# Patient Record
Sex: FEMALE | Race: WHITE | NOT HISPANIC OR LATINO | Employment: OTHER | RURAL
[De-identification: names, ages, dates, MRNs, and addresses within clinical notes are randomized per-mention and may not be internally consistent; named-entity substitution may affect disease eponyms.]

---

## 2022-09-30 ENCOUNTER — HOSPITAL ENCOUNTER (OUTPATIENT)
Dept: RADIOLOGY | Facility: HOSPITAL | Age: 57
Discharge: HOME | End: 2022-09-30
Attending: NURSE PRACTITIONER
Payer: COMMERCIAL

## 2022-09-30 DIAGNOSIS — J40 BRONCHITIS, NOT SPECIFIED AS ACUTE OR CHRONIC: ICD-10-CM

## 2022-09-30 PROCEDURE — 71046 X-RAY EXAM CHEST 2 VIEWS: CPT | Mod: TC

## 2023-04-17 ENCOUNTER — HOSPITAL ENCOUNTER (EMERGENCY)
Facility: HOSPITAL | Age: 58
Discharge: HOME | End: 2023-04-18
Payer: COMMERCIAL

## 2023-04-17 DIAGNOSIS — R00.0 TACHYCARDIA: ICD-10-CM

## 2023-04-17 DIAGNOSIS — I48.91 ATRIAL FIBRILLATION WITH RAPID VENTRICULAR RESPONSE: ICD-10-CM

## 2023-04-17 DIAGNOSIS — I49.9 CARDIAC RHYTHM DISORDER OR DISTURBANCE OR CHANGE: ICD-10-CM

## 2023-04-17 DIAGNOSIS — I48.91 NEW ONSET A-FIB: Primary | ICD-10-CM

## 2023-04-17 PROCEDURE — 99285 EMERGENCY DEPT VISIT HI MDM: CPT | Mod: ,,, | Performed by: NURSE PRACTITIONER

## 2023-04-17 PROCEDURE — 93010 ELECTROCARDIOGRAM REPORT: CPT | Mod: ,,, | Performed by: INTERNAL MEDICINE

## 2023-04-17 PROCEDURE — 93010 EKG 12-LEAD: ICD-10-PCS | Mod: ,,, | Performed by: INTERNAL MEDICINE

## 2023-04-17 PROCEDURE — 93005 ELECTROCARDIOGRAM TRACING: CPT

## 2023-04-17 PROCEDURE — 99285 PR EMERGENCY DEPT VISIT,LEVEL V: ICD-10-PCS | Mod: ,,, | Performed by: NURSE PRACTITIONER

## 2023-04-17 PROCEDURE — 99284 EMERGENCY DEPT VISIT MOD MDM: CPT | Mod: 25

## 2023-04-17 RX ORDER — AZATHIOPRINE 50 MG/1
100 TABLET ORAL
COMMUNITY
Start: 2023-03-22

## 2023-04-17 RX ORDER — PANTOPRAZOLE SODIUM 40 MG/1
40 TABLET, DELAYED RELEASE ORAL EVERY MORNING
COMMUNITY
Start: 2023-03-18

## 2023-04-17 RX ORDER — MESALAMINE 1.2 G/1
TABLET, DELAYED RELEASE ORAL
COMMUNITY

## 2023-04-18 VITALS
OXYGEN SATURATION: 96 % | SYSTOLIC BLOOD PRESSURE: 144 MMHG | RESPIRATION RATE: 16 BRPM | WEIGHT: 176 LBS | BODY MASS INDEX: 31.18 KG/M2 | HEIGHT: 63 IN | HEART RATE: 88 BPM | TEMPERATURE: 98 F | DIASTOLIC BLOOD PRESSURE: 73 MMHG

## 2023-04-18 LAB
ALBUMIN SERPL BCP-MCNC: 3.7 G/DL (ref 3.5–5)
ALBUMIN/GLOB SERPL: 1 {RATIO}
ALP SERPL-CCNC: 147 U/L (ref 46–118)
ALT SERPL W P-5'-P-CCNC: 24 U/L (ref 13–56)
AMPHET UR QL SCN: NEGATIVE
ANION GAP SERPL CALCULATED.3IONS-SCNC: 12 MMOL/L (ref 7–16)
AST SERPL W P-5'-P-CCNC: 15 U/L (ref 15–37)
BACTERIA #/AREA URNS HPF: ABNORMAL /HPF
BARBITURATES UR QL SCN: NEGATIVE
BASOPHILS # BLD AUTO: 0.11 K/UL (ref 0–0.2)
BASOPHILS NFR BLD AUTO: 0.8 % (ref 0–1)
BENZODIAZ METAB UR QL SCN: NEGATIVE
BILIRUB SERPL-MCNC: 0.5 MG/DL (ref ?–1.2)
BILIRUB UR QL STRIP: NEGATIVE
BUN SERPL-MCNC: 24 MG/DL (ref 7–18)
BUN/CREAT SERPL: 25 (ref 6–20)
CALCIUM SERPL-MCNC: 8.8 MG/DL (ref 8.5–10.1)
CHLORIDE SERPL-SCNC: 102 MMOL/L (ref 98–107)
CLARITY UR: CLEAR
CO2 SERPL-SCNC: 30 MMOL/L (ref 21–32)
COCAINE UR QL SCN: NEGATIVE
COLOR UR: ABNORMAL
CREAT SERPL-MCNC: 0.95 MG/DL (ref 0.55–1.02)
DIFFERENTIAL METHOD BLD: ABNORMAL
EGFR (NO RACE VARIABLE) (RUSH/TITUS): 69 ML/MIN/1.73M²
EOSINOPHIL # BLD AUTO: 0.65 K/UL (ref 0–0.5)
EOSINOPHIL NFR BLD AUTO: 4.9 % (ref 1–4)
ERYTHROCYTE [DISTWIDTH] IN BLOOD BY AUTOMATED COUNT: 13.3 % (ref 11.5–14.5)
GLOBULIN SER-MCNC: 3.7 G/DL (ref 2–4)
GLUCOSE SERPL-MCNC: 165 MG/DL (ref 74–106)
GLUCOSE UR STRIP-MCNC: NEGATIVE MG/DL
HCT VFR BLD AUTO: 40.9 % (ref 38–47)
HGB BLD-MCNC: 13.3 G/DL (ref 12–16)
KETONES UR STRIP-SCNC: NEGATIVE MG/DL
LEUKOCYTE ESTERASE UR QL STRIP: NEGATIVE
LYMPHOCYTES # BLD AUTO: 3.39 K/UL (ref 1–4.8)
LYMPHOCYTES NFR BLD AUTO: 25.4 % (ref 27–41)
MAGNESIUM SERPL-MCNC: 2 MG/DL (ref 1.7–2.3)
MCH RBC QN AUTO: 32.1 PG (ref 27–31)
MCHC RBC AUTO-ENTMCNC: 32.5 G/DL (ref 32–36)
MCV RBC AUTO: 98.8 FL (ref 80–96)
MDA UR QL SCN: NEGATIVE
METHADONE UR QL SCN: NEGATIVE
METHAMPHET UR QL SCN: NEGATIVE
MONOCYTES # BLD AUTO: 1.02 K/UL (ref 0–0.8)
MONOCYTES NFR BLD AUTO: 7.6 % (ref 2–6)
MPC BLD CALC-MCNC: 11.7 FL (ref 9.4–12.4)
NEUTROPHILS # BLD AUTO: 8.17 K/UL (ref 1.8–7.7)
NEUTROPHILS NFR BLD AUTO: 61.3 % (ref 53–65)
NITRITE UR QL STRIP: NEGATIVE
OPIATES UR QL SCN: NEGATIVE
OXYCODONE UR QL SCN: NEGATIVE
PCP UR QL SCN: NEGATIVE
PH UR STRIP: 7 PH UNITS
PLATELET # BLD AUTO: 346 K/UL (ref 150–400)
POTASSIUM SERPL-SCNC: 3.8 MMOL/L (ref 3.5–5.1)
PROT SERPL-MCNC: 7.4 G/DL (ref 6.4–8.2)
PROT UR QL STRIP: NEGATIVE
RBC # BLD AUTO: 4.14 M/UL (ref 4.2–5.4)
RBC # UR STRIP: ABNORMAL /UL
RBC #/AREA URNS HPF: ABNORMAL /HPF
SODIUM SERPL-SCNC: 140 MMOL/L (ref 136–145)
SP GR UR STRIP: 1.01
SQUAMOUS #/AREA URNS LPF: ABNORMAL /LPF
THC UR QL SCN: NEGATIVE
TRICYCLICS UR QL SCN: NEGATIVE
TSH SERPL DL<=0.005 MIU/L-ACNC: 2.74 UIU/ML (ref 0.36–3.74)
UROBILINOGEN UR STRIP-ACNC: 0.2 MG/DL
WBC # BLD AUTO: 13.34 K/UL (ref 4.5–11)
WBC #/AREA URNS HPF: ABNORMAL /HPF

## 2023-04-18 PROCEDURE — 96375 TX/PRO/DX INJ NEW DRUG ADDON: CPT

## 2023-04-18 PROCEDURE — 80305 DRUG TEST PRSMV DIR OPT OBS: CPT | Performed by: NURSE PRACTITIONER

## 2023-04-18 PROCEDURE — 84443 ASSAY THYROID STIM HORMONE: CPT | Performed by: NURSE PRACTITIONER

## 2023-04-18 PROCEDURE — 93005 ELECTROCARDIOGRAM TRACING: CPT

## 2023-04-18 PROCEDURE — 93010 ELECTROCARDIOGRAM REPORT: CPT | Mod: 76,,, | Performed by: INTERNAL MEDICINE

## 2023-04-18 PROCEDURE — 96374 THER/PROPH/DIAG INJ IV PUSH: CPT

## 2023-04-18 PROCEDURE — 80053 COMPREHEN METABOLIC PANEL: CPT | Performed by: NURSE PRACTITIONER

## 2023-04-18 PROCEDURE — 83735 ASSAY OF MAGNESIUM: CPT | Performed by: NURSE PRACTITIONER

## 2023-04-18 PROCEDURE — 85025 COMPLETE CBC W/AUTO DIFF WBC: CPT | Performed by: NURSE PRACTITIONER

## 2023-04-18 PROCEDURE — 93010 EKG 12-LEAD: ICD-10-PCS | Mod: 76,,, | Performed by: INTERNAL MEDICINE

## 2023-04-18 PROCEDURE — 81001 URINALYSIS AUTO W/SCOPE: CPT | Performed by: NURSE PRACTITIONER

## 2023-04-18 PROCEDURE — 96361 HYDRATE IV INFUSION ADD-ON: CPT

## 2023-04-18 PROCEDURE — 25000003 PHARM REV CODE 250: Performed by: NURSE PRACTITIONER

## 2023-04-18 RX ORDER — DILTIAZEM HYDROCHLORIDE 5 MG/ML
5 INJECTION INTRAVENOUS
Status: DISCONTINUED | OUTPATIENT
Start: 2023-04-18 | End: 2023-04-18

## 2023-04-18 RX ORDER — DILTIAZEM HYDROCHLORIDE 5 MG/ML
0.35 INJECTION INTRAVENOUS
Status: COMPLETED | OUTPATIENT
Start: 2023-04-18 | End: 2023-04-18

## 2023-04-18 RX ORDER — DILTIAZEM HYDROCHLORIDE 5 MG/ML
20 INJECTION INTRAVENOUS
Status: COMPLETED | OUTPATIENT
Start: 2023-04-18 | End: 2023-04-18

## 2023-04-18 RX ORDER — METOPROLOL SUCCINATE 25 MG/1
25 TABLET, EXTENDED RELEASE ORAL DAILY
Qty: 30 TABLET | Refills: 0 | Status: SHIPPED | OUTPATIENT
Start: 2023-04-18 | End: 2024-04-17

## 2023-04-18 RX ORDER — POTASSIUM CHLORIDE 20 MEQ/1
20 TABLET, EXTENDED RELEASE ORAL
Status: COMPLETED | OUTPATIENT
Start: 2023-04-18 | End: 2023-04-18

## 2023-04-18 RX ADMIN — DILTIAZEM HYDROCHLORIDE 28 MG: 5 INJECTION INTRAVENOUS at 01:04

## 2023-04-18 RX ADMIN — POTASSIUM CHLORIDE 20 MEQ: 1500 TABLET, EXTENDED RELEASE ORAL at 03:04

## 2023-04-18 RX ADMIN — SODIUM CHLORIDE 1000 ML: 9 INJECTION, SOLUTION INTRAVENOUS at 12:04

## 2023-04-18 RX ADMIN — DILTIAZEM HYDROCHLORIDE 20 MG: 5 INJECTION, SOLUTION INTRAVENOUS at 12:04

## 2023-04-18 NOTE — ED PROVIDER NOTES
Encounter Date: 2023       History     Chief Complaint   Patient presents with    Hypertension    Tachycardia     58 yr old WF to ED with c/o palpitations tonight.  Denies chest pain.  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  Denies hx of symptoms     The history is provided by the patient.   Palpitations   This is a new problem. The current episode started 2 to 3 hours ago. The problem has been unchanged. The problem is associated with an unknown factor. Pertinent negatives include no diaphoresis, no chest pain, no chest pressure, no near-syncope, no syncope, no nausea, no vomiting, no dizziness, no weakness and no shortness of breath. She has tried nothing for the symptoms. There are no known risk factors.   Review of patient's allergies indicates:   Allergen Reactions    Latex, natural rubber Itching     Past Medical History:   Diagnosis Date    Ulcerative colitis      Past Surgical History:   Procedure Laterality Date    BACK SURGERY       SECTION       History reviewed. No pertinent family history.  Social History     Tobacco Use    Smoking status: Never     Passive exposure: Never    Smokeless tobacco: Never   Substance Use Topics    Alcohol use: Never    Drug use: Never     Review of Systems   Constitutional:  Negative for diaphoresis.   HENT: Negative.     Respiratory:  Negative for shortness of breath.    Cardiovascular:  Positive for palpitations. Negative for chest pain, syncope and near-syncope.   Gastrointestinal:  Negative for nausea and vomiting.   Genitourinary:  Negative for difficulty urinating.   Musculoskeletal: Negative.    Skin: Negative.    Neurological:  Negative for dizziness and weakness.   Psychiatric/Behavioral:  Negative for agitation.      Physical Exam     Initial Vitals [23 2345]   BP Pulse Resp Temp SpO2   (!) 148/97 (!) 147 17 98.1 °F (36.7 °C) 96 %      MAP       --         Physical Exam    Nursing note and vitals  reviewed.  Constitutional: She appears well-developed and well-nourished.   Neck: Neck supple.   Cardiovascular:    Tachycardia present.         Pulmonary/Chest: Breath sounds normal.   Abdominal: Abdomen is soft.   Musculoskeletal:         General: Normal range of motion.      Cervical back: Neck supple.     Neurological: She is alert and oriented to person, place, and time.   Skin: Skin is warm and dry.       Medical Screening Exam   See Full Note    ED Course   Procedures  Labs Reviewed   COMPREHENSIVE METABOLIC PANEL - Abnormal; Notable for the following components:       Result Value    Glucose 165 (*)     BUN 24 (*)     BUN/Creatinine Ratio 25 (*)     Alk Phos 147 (*)     All other components within normal limits   CBC WITH DIFFERENTIAL - Abnormal; Notable for the following components:    WBC 13.34 (*)     RBC 4.14 (*)     MCV 98.8 (*)     MCH 32.1 (*)     Lymphocytes % 25.4 (*)     Neutrophils, Abs 8.17 (*)     Monocytes % 7.6 (*)     Eosinophils % 4.9 (*)     Monocytes, Absolute 1.02 (*)     Eosinophils, Absolute 0.65 (*)     All other components within normal limits   URINALYSIS, REFLEX TO URINE CULTURE - Abnormal; Notable for the following components:    Blood, UA Trace-Lysed (*)     All other components within normal limits   URINALYSIS, MICROSCOPIC - Abnormal; Notable for the following components:    Squamous Epithelial Cells, UA Occasional (*)     All other components within normal limits   TSH - Normal   DRUG SCREEN, URINE (BEAKER) - Normal   MAGNESIUM - Normal   CBC W/ AUTO DIFFERENTIAL    Narrative:     The following orders were created for panel order CBC Auto Differential.  Procedure                               Abnormality         Status                     ---------                               -----------         ------                     CBC with Differential[[id number]]        Abnormal            Final result                 Please view results for these tests on the individual orders.         ECG Results              EKG 12-lead (In process)  Result time 04/18/23 02:00:03      In process by Interface, Lab In [facility] (04/18/23 02:00:03)                   Narrative:    Test Reason : I49.9,    Vent. Rate : 111 BPM     Atrial Rate : 117 BPM     P-R Int : 000 ms          QRS Dur : 074 ms      QT Int : 334 ms       P-R-T Axes : 000 048 017 degrees     QTc Int : 454 ms    Atrial fibrillation with rapid ventricular response  Abnormal ECG  When compared with ECG of 17-APR-2023 23:51,  No significant change was found    Referred By:             Confirmed By:                                      EKG 12-lead (In process)  Result time 04/18/23 01:59:47      In process by Interface, Lab In [facility] (04/18/23 01:59:47)                   Narrative:    Test Reason : R00.0,    Vent. Rate : 158 BPM     Atrial Rate : 170 BPM     P-R Int : 000 ms          QRS Dur : 076 ms      QT Int : 284 ms       P-R-T Axes : 000 067 -31 degrees     QTc Int : 460 ms    Atrial fibrillation with rapid ventricular response  Nonspecific ST abnormality , probably digitalis effect  Abnormal QRS-T angle, consider primary T wave abnormality  Abnormal ECG  No previous ECGs available    Referred By:             Confirmed By:                                   Imaging Results    None          Medications   sodium chloride 0.9% bolus 1,000 mL 1,000 mL (0 mLs Intravenous Stopped 4/18/23 0104)   diltiaZEM injection 20 mg (20 mg Intravenous Given 4/18/23 0051)   diltiaZEM injection 28 mg (28 mg Intravenous Given 4/18/23 0112)   potassium chloride SA CR tablet 20 mEq (20 mEq Oral Given 4/18/23 0317)     Medical Decision Making:   Initial Assessment:   58 yr old WF to ED with c/o palpitations tonight.  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  Denies chest pain, denies SOB.  States PMH of Colitis for which she takes imuran and mesalamine and protonix.  She states she took a  "Robaxin before bed tonight.  States had a "pure" energy drink Sunday but has not had any since  Differential Diagnosis:   SVT  Afib RVR  Dehydration    Clinical Tests:   Lab Tests: Ordered and Reviewed  The following lab test(s) were unremarkable: CBC, CMP and Urinalysis       <> Summary of Lab: TSH  No acute findings  ED Management:  IV fluids, diltiazem 0.25mg/kg, then 0.35mg/kg with some improvement but pt still fluctuating RVR.  Diltiazem infusion start at 5 and titrate to 15 - cancelled due to spontaneous conversion to NSR      Other:   I have discussed this case with another health care provider.       <> Summary of the Discussion: Consult via audio/ video with Dr. Morales who suggest transfer to facility with cardiology for cardioversion.    Discussed conversion to NSR  with Dr. Morales who talked with [facility] cardiology.  Plan to monitor for 2 hours to make sure pt remains in NSR.  Will then DC on toprol 25 mg and  and instructions to f/u with cardiology in next week.  New onset Afib - Converted to NSR  CHADS2 score -0 points / [id number] 1 point    RX Toprol 25 mg and  mg  F/u with cardiology  Return for return of symptoms.            ED Course as of 04/18/23 0526   Tue Apr 18, 2023   0126 HR fluctuating between 108 - 130.  Pt still denies chest pain, will continue to monitor [CG]   0226 Resting without complaints.  HR now between 120 - 150.  Awaiting telemed consult.  [CG]   0257 Pt HR up to 160, will start infusion  [CG]   0318 Pt ambulatory to bathroom.  Reports cramping to both calves now right more than left.  Reapplied cardiac monitor. Noted NSR, will repeat EKG..  Will hold diltiazem infusion at this time   [CG]   0327 Reported NSR to Dr. Morales, who states will call back [CG]   0329 Discussed pt with Dr. Morales who consulted with [facility] cardiology.  Plan to monitor for 2 hours to make sure pt remains in NSR.  Will then DC on toprol 25 mg and  and instructions to f/u with " cardiology in next week. [CG]   0519 Pt still NSR at 83.  Denies complaints.  States is ready to go home [CG]      ED Course User Index  [CG] SOO Raya                Clinical Impression:   Final diagnoses:  [R00.0] Tachycardia  [I49.9] Cardiac rhythm disorder or disturbance or change  [I48.91] New onset a-fib - converted to NSR (Primary)  [I48.91] Atrial fibrillation with rapid ventricular response        ED Disposition Condition    Discharge Stable          ED Prescriptions       Medication Sig Dispense Start Date End Date Auth. Provider    metoprolol succinate (TOPROL-XL) 25 MG 24 hr tablet Take 1 tablet (25 mg total) by mouth once daily. 30 tablet 4/18/2023 4/17/2024 SOO Raya          Follow-up Information       Follow up With Specialties Details Why Contact Info    Guido Kaur MD Interventional Cardiology, Cardiology   [address] [facility] [address]  [phone]               SOO Raya  04/18/23 0584

## 2023-04-18 NOTE — ED NOTES
Discharge instructions given and explained to pt and she verbalized her understanding. Pt is stable and her heart rate is normal at this time. Pt denies any chest pain and her  is here to drive her home.

## 2023-04-18 NOTE — ED TRIAGE NOTES
Pt presents to the ED with complaints of htn and heart racing. 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 Pt states shortly after going to bed she woke up with her heart racing. Pt states she took her b/p and it was elevated pta.

## 2023-04-18 NOTE — DISCHARGE INSTRUCTIONS
Take Toprol 25 mg daily.  Take Aspirin 325 mg daily.  Follow up with Dr. Kaur -cardiology.  Return here or other ER for return of palpitation symptoms, chest pain or concerning symptoms.

## 2023-07-30 ENCOUNTER — HOSPITAL ENCOUNTER (EMERGENCY)
Facility: HOSPITAL | Age: 58
Discharge: HOME | End: 2023-07-31
Payer: COMMERCIAL

## 2023-07-30 DIAGNOSIS — I48.91 ATRIAL FIBRILLATION WITH RVR: Primary | ICD-10-CM

## 2023-07-30 DIAGNOSIS — R00.2 PALPITATIONS: ICD-10-CM

## 2023-07-30 LAB
BASOPHILS # BLD AUTO: 0.1 K/UL (ref 0–0.2)
BASOPHILS NFR BLD AUTO: 0.8 % (ref 0–1)
DIFFERENTIAL METHOD BLD: ABNORMAL
EOSINOPHIL # BLD AUTO: 0.68 K/UL (ref 0–0.5)
EOSINOPHIL NFR BLD AUTO: 5.7 % (ref 1–4)
ERYTHROCYTE [DISTWIDTH] IN BLOOD BY AUTOMATED COUNT: 13.8 % (ref 11.5–14.5)
HCT VFR BLD AUTO: 41.9 % (ref 38–47)
HGB BLD-MCNC: 13.4 G/DL (ref 12–16)
LYMPHOCYTES # BLD AUTO: 2.87 K/UL (ref 1–4.8)
LYMPHOCYTES NFR BLD AUTO: 24.2 % (ref 27–41)
MCH RBC QN AUTO: 31.8 PG (ref 27–31)
MCHC RBC AUTO-ENTMCNC: 32 G/DL (ref 32–36)
MCV RBC AUTO: 99.5 FL (ref 80–96)
MONOCYTES # BLD AUTO: 1.16 K/UL (ref 0–0.8)
MONOCYTES NFR BLD AUTO: 9.8 % (ref 2–6)
MPC BLD CALC-MCNC: 12.6 FL (ref 9.4–12.4)
NEUTROPHILS # BLD AUTO: 7.04 K/UL (ref 1.8–7.7)
NEUTROPHILS NFR BLD AUTO: 59.5 % (ref 53–65)
PLATELET # BLD AUTO: 273 K/UL (ref 150–400)
RBC # BLD AUTO: 4.21 M/UL (ref 4.2–5.4)
WBC # BLD AUTO: 11.85 K/UL (ref 4.5–11)

## 2023-07-30 PROCEDURE — 96374 THER/PROPH/DIAG INJ IV PUSH: CPT

## 2023-07-30 PROCEDURE — 80053 COMPREHEN METABOLIC PANEL: CPT | Performed by: NURSE PRACTITIONER

## 2023-07-30 PROCEDURE — 83735 ASSAY OF MAGNESIUM: CPT | Performed by: NURSE PRACTITIONER

## 2023-07-30 PROCEDURE — 85025 COMPLETE CBC W/AUTO DIFF WBC: CPT | Performed by: NURSE PRACTITIONER

## 2023-07-30 PROCEDURE — 25000003 PHARM REV CODE 250: Performed by: NURSE PRACTITIONER

## 2023-07-30 PROCEDURE — 99285 PR EMERGENCY DEPT VISIT,LEVEL V: ICD-10-PCS | Mod: ,,, | Performed by: NURSE PRACTITIONER

## 2023-07-30 PROCEDURE — 99285 EMERGENCY DEPT VISIT HI MDM: CPT | Mod: ,,, | Performed by: NURSE PRACTITIONER

## 2023-07-30 PROCEDURE — 84484 ASSAY OF TROPONIN QUANT: CPT | Performed by: NURSE PRACTITIONER

## 2023-07-30 PROCEDURE — 99284 EMERGENCY DEPT VISIT MOD MDM: CPT | Mod: 25

## 2023-07-30 PROCEDURE — 93010 EKG 12-LEAD: ICD-10-PCS | Mod: ,,, | Performed by: INTERNAL MEDICINE

## 2023-07-30 PROCEDURE — 93010 ELECTROCARDIOGRAM REPORT: CPT | Mod: ,,, | Performed by: INTERNAL MEDICINE

## 2023-07-30 PROCEDURE — 93005 ELECTROCARDIOGRAM TRACING: CPT

## 2023-07-30 RX ORDER — METOPROLOL TARTRATE 1 MG/ML
10 INJECTION, SOLUTION INTRAVENOUS
Status: COMPLETED | OUTPATIENT
Start: 2023-07-31 | End: 2023-07-30

## 2023-07-30 RX ORDER — AMLODIPINE BESYLATE 5 MG/1
5 TABLET ORAL DAILY
COMMUNITY

## 2023-07-30 RX ADMIN — METOPROLOL TARTRATE 10 MG: 1 INJECTION, SOLUTION INTRAVENOUS at 11:07

## 2023-07-31 VITALS
SYSTOLIC BLOOD PRESSURE: 117 MMHG | RESPIRATION RATE: 16 BRPM | DIASTOLIC BLOOD PRESSURE: 70 MMHG | BODY MASS INDEX: 32.57 KG/M2 | TEMPERATURE: 98 F | OXYGEN SATURATION: 96 % | HEIGHT: 63 IN | HEART RATE: 64 BPM | WEIGHT: 183.81 LBS

## 2023-07-31 LAB
ALBUMIN SERPL BCP-MCNC: 3.6 G/DL (ref 3.5–5)
ALBUMIN/GLOB SERPL: 0.9 {RATIO}
ALP SERPL-CCNC: 115 U/L (ref 46–118)
ALT SERPL W P-5'-P-CCNC: 17 U/L (ref 13–56)
ANION GAP SERPL CALCULATED.3IONS-SCNC: 11 MMOL/L (ref 7–16)
AST SERPL W P-5'-P-CCNC: 33 U/L (ref 15–37)
BILIRUB SERPL-MCNC: 0.6 MG/DL (ref ?–1.2)
BUN SERPL-MCNC: 18 MG/DL (ref 7–18)
BUN/CREAT SERPL: 23 (ref 6–20)
CALCIUM SERPL-MCNC: 8.9 MG/DL (ref 8.5–10.1)
CHLORIDE SERPL-SCNC: 103 MMOL/L (ref 98–107)
CO2 SERPL-SCNC: 30 MMOL/L (ref 21–32)
CREAT SERPL-MCNC: 0.78 MG/DL (ref 0.55–1.02)
EGFR (NO RACE VARIABLE) (RUSH/TITUS): 88 ML/MIN/1.73M2
GLOBULIN SER-MCNC: 4 G/DL (ref 2–4)
GLUCOSE SERPL-MCNC: 128 MG/DL (ref 74–106)
MAGNESIUM SERPL-MCNC: 2.2 MG/DL (ref 1.7–2.3)
POTASSIUM SERPL-SCNC: 3.8 MMOL/L (ref 3.5–5.1)
PROT SERPL-MCNC: 7.6 G/DL (ref 6.4–8.2)
SODIUM SERPL-SCNC: 140 MMOL/L (ref 136–145)
TROPONIN I SERPL DL<=0.01 NG/ML-MCNC: <4 PG/ML

## 2023-07-31 PROCEDURE — 93005 ELECTROCARDIOGRAM TRACING: CPT

## 2023-07-31 PROCEDURE — 96375 TX/PRO/DX INJ NEW DRUG ADDON: CPT

## 2023-07-31 PROCEDURE — 93010 EKG 12-LEAD: ICD-10-PCS | Mod: ,,, | Performed by: INTERNAL MEDICINE

## 2023-07-31 PROCEDURE — 25000003 PHARM REV CODE 250: Performed by: NURSE PRACTITIONER

## 2023-07-31 PROCEDURE — 93010 ELECTROCARDIOGRAM REPORT: CPT | Mod: ,,, | Performed by: INTERNAL MEDICINE

## 2023-07-31 RX ORDER — METOPROLOL TARTRATE 25 MG/1
25 TABLET, FILM COATED ORAL
Status: COMPLETED | OUTPATIENT
Start: 2023-07-31 | End: 2023-07-31

## 2023-07-31 RX ORDER — DILTIAZEM HYDROCHLORIDE 5 MG/ML
20 INJECTION INTRAVENOUS
Status: COMPLETED | OUTPATIENT
Start: 2023-07-31 | End: 2023-07-31

## 2023-07-31 RX ADMIN — DILTIAZEM HYDROCHLORIDE 20 MG: 5 INJECTION INTRAVENOUS at 12:07

## 2023-07-31 RX ADMIN — METOPROLOL TARTRATE 25 MG: 25 TABLET, FILM COATED ORAL at 02:07

## 2023-07-31 NOTE — DISCHARGE INSTRUCTIONS
Take another 50 mg Metoprolol when you wake up in the morning. Contact cardiologist in AM. Return to the ER for worsening symptoms.     The examination and treatment you have received in the Emergency Department today have been rendered on an emergency basis only and are not intended to be a substitute for an effort to provide complete medical care. You should contact your follow-up physician as it is important that you let him or her check you and report any new or remaining problems since it is impossible to recognize and treat all elements of an injury or illness in a single emergency care center visit.

## 2023-07-31 NOTE — ED PROVIDER NOTES
Encounter Date: 2023       History     Chief Complaint   Patient presents with    Tachycardia     Pt reports being awakened by her heart racing. Hx of A-Fib.     57 yo WF presents with c/o heart racing. New onset Afib in April and following with Dr. Mckeon at [facility] cardiology. Reports she was awakened just PTA with heart racing. Compliant with medications including Eliquis, Imuran and Metoprolol. HR currently averaging between 117 and 145. Denies CP or SOB.     The history is provided by the patient and the spouse.   Palpitations   This is a recurrent problem. The current episode started just prior to arrival. The problem occurs rarely. The problem has been unchanged. The problem is associated with an unknown factor. On average, each episode lasts 30 minutes. Pertinent negatives include no fever, no chest pain, no nausea, no vomiting, no back pain, no dizziness, no weakness, no cough and no shortness of breath. She has tried breathing exercises for the symptoms. The treatment provided no relief. Risk factors include obesity and post menopause.     Review of patient's allergies indicates:   Allergen Reactions    Latex, natural rubber Itching     Past Medical History:   Diagnosis Date    Ulcerative colitis      Past Surgical History:   Procedure Laterality Date    BACK SURGERY       SECTION       No family history on file.  Social History     Tobacco Use    Smoking status: Never     Passive exposure: Never    Smokeless tobacco: Never   Substance Use Topics    Alcohol use: Never    Drug use: Never     Review of Systems   Constitutional:  Negative for chills and fever.   Respiratory:  Negative for cough, shortness of breath and wheezing.    Cardiovascular:  Positive for palpitations. Negative for chest pain and leg swelling.   Gastrointestinal:  Negative for nausea and vomiting.   Musculoskeletal:  Negative for back pain, neck pain and neck stiffness.   Neurological:  Negative for dizziness and weakness.    Psychiatric/Behavioral:  Negative for confusion.    All other systems reviewed and are negative.      Physical Exam     Initial Vitals [07/30/23 2342]   BP Pulse Resp Temp SpO2   (!) 148/98 (!) 130 20 97.9 °F (36.6 °C) 96 %      MAP       --         Physical Exam    Nursing note and vitals reviewed.  Constitutional: She appears well-developed and well-nourished.   HENT:   Head: Normocephalic and atraumatic.   Right Ear: External ear normal.   Left Ear: External ear normal.   Nose: Nose normal.   Mouth/Throat: Oropharynx is clear and moist.   Eyes: EOM are normal. Pupils are equal, round, and reactive to light.   Neck: Neck supple.   Normal range of motion.  Cardiovascular:  An irregular rhythm present.   Tachycardia present.         Pulmonary/Chest: Breath sounds normal.   Abdominal: Abdomen is soft. Bowel sounds are normal. She exhibits no distension. There is no abdominal tenderness.   Musculoskeletal:         General: Normal range of motion.      Cervical back: Normal range of motion and neck supple.     Neurological: She is alert and oriented to person, place, and time. GCS score is 15. GCS eye subscore is 4. GCS verbal subscore is 5. GCS motor subscore is 6.   Skin: Skin is warm. Capillary refill takes less than 2 seconds.   Psychiatric: She has a normal mood and affect. Her behavior is normal. Judgment and thought content normal.         Medical Screening Exam   See Full Note    ED Course   Procedures  Labs Reviewed   COMPREHENSIVE METABOLIC PANEL - Abnormal; Notable for the following components:       Result Value    Glucose 128 (*)     BUN/Creatinine Ratio 23 (*)     All other components within normal limits    Narrative:     LYTES VERIFIED BY REDRAW   CBC WITH DIFFERENTIAL - Abnormal; Notable for the following components:    WBC 11.85 (*)     MCV 99.5 (*)     MCH 31.8 (*)     MPV 12.6 (*)     Lymphocytes % 24.2 (*)     Monocytes % 9.8 (*)     Eosinophils % 5.7 (*)     Monocytes, Absolute 1.16 (*)      Eosinophils, Absolute 0.68 (*)     All other components within normal limits   TROPONIN I - Normal   MAGNESIUM - Normal   CBC W/ AUTO DIFFERENTIAL    Narrative:     The following orders were created for panel order CBC auto differential.  Procedure                               Abnormality         Status                     ---------                               -----------         ------                     CBC with Differential[[id number]]        Abnormal            Final result                 Please view results for these tests on the individual orders.     EKG Readings: (Independently Interpreted)   Initial Reading: No STEMI. Rhythm: Atrial Fibrillation. Heart Rate: 133. Ectopy: No Ectopy. Clinical Impression: Atrial Fibrillation with RVR   Repeat EKG Afib  3rd EKG NSR       Imaging Results    None          Medications   metoprolol injection 10 mg (10 mg Intravenous Given 7/30/23 9712)   diltiaZEM injection 20 mg (20 mg Intravenous Given 7/31/23 0023)   metoprolol tartrate (LOPRESSOR) tablet 25 mg (25 mg Oral Given 7/31/23 0225)     Medical Decision Making:   Initial Assessment:   59 yo WF presents with c/o heart racing. New onset Afib in April and following with Dr. Mckeon at [facility] cardiology. Reports she was awakened just PTA with heart racing. Compliant with medications including Eliquis, Imuran and Metoprolol. HR currently averaging between 117 and 145. Denies CP or SOB.     The history is provided by the patient and the spouse.   Palpitations   This is a recurrent problem. The current episode started just prior to arrival. The problem occurs rarely. The problem has been unchanged. The problem is associated with an unknown factor. On average, each episode lasts 30 minutes. Pertinent negatives include no fever, no chest pain, no nausea, no vomiting, no back pain, no dizziness, no weakness, no cough and no shortness of breath. She has tried breathing exercises for the symptoms. The treatment provided no  relief. Risk factors include obesity and post menopause.       Differential Diagnosis:   Afib  Heart block  ACS  Clinical Tests:   Lab Tests: Ordered and Reviewed  Medical Tests: Ordered and Reviewed  ED Management:  Patient in Afib with RVR on arrival with max . No complaint of CP or SOB  Given 10 mg Metoprolol IV with no improvement  Given 25 mg Diltiazem with improvement in HR but still in RVR and patient with palpitations  CHADS2 Score 1  [id number] Score 2 - already anticoagulated on Eliquis  Telemed consult with Dr. Sandifer at [facility] - patient converted for about 2 minutes while on TH visit then returned to Afib. Dr. Sandifer recommended 25 mg immediate release Metoprolol and call him back in 30 minutes. ]  Converted back to NSR about 15 minutes after oral metoprolol given. Contacted Dr. Sandifer with this Information. Advised her to take 50 mg XL now and repeat 50 mg XL when she wakes up. Contact cardiologist later today. He is ok with discharge.   Patient and spouse agreeable with plan of care, all questions and concerns addressed.   Strict return and follow-up precautions have been given by me personally to the patient/family/caregiver(s).    Data Reviewed/Counseling: I have reviewed the patient's vital signs, nursing notes, and other relevant tests/information. I had a detailed discussion regarding the historical points, exam findings, and any diagnostic results supporting the discharge diagnosis. I also discussed the need for outpatient follow-up and the need to return to the ED if symptoms worsen or if there are any questions or concerns that arise at home.      Other:   I have discussed this case with another health care provider.       <> Summary of the Discussion: Telemed consult with Dr. Sandifer, [facility]                ED Course as of 07/31/23 0311 Mon Jul 31, 2023   0017 No change in HR with Metoprolol 10 mg IV [MJ]   0041 HR improved with Diltiazem IV but still in Afib and patient still with  palpitations.  [MJ]   0042 Telemed consult requested [MJ]   0152 Telemed consult with Dr. Sandifer at [facility].  [MJ]   0154 HR increasing, averaging 115-129 [MJ]      ED Course User Index  [MJ] SOO Hardin                  Clinical Impression:   Final diagnoses:  [R00.2] Palpitations  [I48.91] Atrial fibrillation with RVR (Primary)        ED Disposition Condition    Discharge Stable          ED Prescriptions    None       Follow-up Information    None               SOO Hardin  07/31/23 0315

## 2024-08-04 ENCOUNTER — HOSPITAL ENCOUNTER (EMERGENCY)
Facility: HOSPITAL | Age: 59
Discharge: HOME | End: 2024-08-04
Payer: COMMERCIAL

## 2024-08-04 VITALS
BODY MASS INDEX: 33.49 KG/M2 | SYSTOLIC BLOOD PRESSURE: 153 MMHG | WEIGHT: 189 LBS | HEIGHT: 63 IN | RESPIRATION RATE: 16 BRPM | OXYGEN SATURATION: 97 % | HEART RATE: 70 BPM | DIASTOLIC BLOOD PRESSURE: 86 MMHG | TEMPERATURE: 98 F

## 2024-08-04 DIAGNOSIS — M25.562 ACUTE PAIN OF LEFT KNEE: Primary | ICD-10-CM

## 2024-08-04 PROCEDURE — 99284 EMERGENCY DEPT VISIT MOD MDM: CPT | Mod: 25

## 2024-08-04 PROCEDURE — 99284 EMERGENCY DEPT VISIT MOD MDM: CPT | Mod: ,,, | Performed by: NURSE PRACTITIONER

## 2024-08-04 PROCEDURE — 63600175 PHARM REV CODE 636 W HCPCS: Performed by: NURSE PRACTITIONER

## 2024-08-04 PROCEDURE — 96372 THER/PROPH/DIAG INJ SC/IM: CPT | Performed by: NURSE PRACTITIONER

## 2024-08-04 RX ORDER — HYDROCODONE BITARTRATE AND ACETAMINOPHEN 7.5; 325 MG/1; MG/1
1 TABLET ORAL EVERY 6 HOURS PRN
Qty: 12 TABLET | Refills: 0 | Status: SHIPPED | OUTPATIENT
Start: 2024-08-04

## 2024-08-04 RX ORDER — NAPROXEN 250 MG/1
250 TABLET ORAL
Qty: 30 TABLET | Refills: 0 | Status: SHIPPED | OUTPATIENT
Start: 2024-08-04

## 2024-08-04 RX ORDER — KETOROLAC TROMETHAMINE 30 MG/ML
30 INJECTION, SOLUTION INTRAMUSCULAR; INTRAVENOUS
Status: COMPLETED | OUTPATIENT
Start: 2024-08-04 | End: 2024-08-04

## 2024-08-04 RX ADMIN — KETOROLAC TROMETHAMINE 30 MG: 30 INJECTION, SOLUTION INTRAMUSCULAR at 12:08

## 2024-12-30 ENCOUNTER — HOSPITAL ENCOUNTER (EMERGENCY)
Facility: HOSPITAL | Age: 59
Discharge: HOME | End: 2024-12-30
Payer: COMMERCIAL

## 2024-12-30 VITALS
OXYGEN SATURATION: 95 % | DIASTOLIC BLOOD PRESSURE: 73 MMHG | HEIGHT: 63 IN | HEART RATE: 90 BPM | BODY MASS INDEX: 31.89 KG/M2 | RESPIRATION RATE: 18 BRPM | TEMPERATURE: 99 F | WEIGHT: 180 LBS | SYSTOLIC BLOOD PRESSURE: 137 MMHG

## 2024-12-30 DIAGNOSIS — J06.9 VIRAL URI WITH COUGH: Primary | ICD-10-CM

## 2024-12-30 PROCEDURE — 99284 EMERGENCY DEPT VISIT MOD MDM: CPT | Mod: 25

## 2024-12-30 PROCEDURE — 99284 EMERGENCY DEPT VISIT MOD MDM: CPT | Mod: GF,,, | Performed by: NURSE PRACTITIONER

## 2024-12-30 PROCEDURE — 25000242 PHARM REV CODE 250 ALT 637 W/ HCPCS: Performed by: NURSE PRACTITIONER

## 2024-12-30 PROCEDURE — 94640 AIRWAY INHALATION TREATMENT: CPT

## 2024-12-30 RX ORDER — PROMETHAZINE HYDROCHLORIDE AND DEXTROMETHORPHAN HYDROBROMIDE 6.25; 15 MG/5ML; MG/5ML
5 SYRUP ORAL EVERY 4 HOURS PRN
Qty: 118 ML | Refills: 0 | Status: SHIPPED | OUTPATIENT
Start: 2024-12-30 | End: 2025-01-09

## 2024-12-30 RX ORDER — ALBUTEROL SULFATE 90 UG/1
2 INHALANT RESPIRATORY (INHALATION) EVERY 6 HOURS PRN
Qty: 8 G | Refills: 0 | Status: SHIPPED | OUTPATIENT
Start: 2024-12-30 | End: 2025-12-30

## 2024-12-30 RX ORDER — IPRATROPIUM BROMIDE AND ALBUTEROL SULFATE 2.5; .5 MG/3ML; MG/3ML
3 SOLUTION RESPIRATORY (INHALATION)
Status: COMPLETED | OUTPATIENT
Start: 2024-12-30 | End: 2024-12-30

## 2024-12-30 RX ADMIN — IPRATROPIUM BROMIDE AND ALBUTEROL SULFATE 3 ML: .5; 3 SOLUTION RESPIRATORY (INHALATION) at 11:12

## 2024-12-31 NOTE — DISCHARGE INSTRUCTIONS
Cough medication may cause drowsiness, do not drive while taking. Follow up with primary care provider as needed. Return to the ER for worsening symptoms.     The examination and treatment you have received in the Emergency Department today have been rendered on an emergency basis only and are not intended to be a substitute for an effort to provide complete medical care. You should contact your follow-up physician as it is important that you let him or her check you and report any new or remaining problems since it is impossible to recognize and treat all elements of an injury or illness in a single emergency care center visit.

## 2024-12-31 NOTE — ED PROVIDER NOTES
Encounter Date: 2024       History     Chief Complaint   Patient presents with    Cough    Nasal Congestion     Symptoms x 1 week,      60 yo female ambulatory to ED with c/o productive cough. Diagnosed with Flu  and Strep . Just completed zithromax and steroids.     The history is provided by the patient.     Review of patient's allergies indicates:   Allergen Reactions    Latex, natural rubber Itching     Past Medical History:   Diagnosis Date    GERD (gastroesophageal reflux disease)     Hypertension     Ulcerative colitis      Past Surgical History:   Procedure Laterality Date    BACK SURGERY       SECTION       No family history on file.  Social History     Tobacco Use    Smoking status: Never     Passive exposure: Never    Smokeless tobacco: Never   Substance Use Topics    Alcohol use: Never    Drug use: Never     Review of Systems   Constitutional:  Negative for chills and fever.   Respiratory:  Positive for cough. Negative for shortness of breath and wheezing.    Gastrointestinal:  Negative for abdominal pain, nausea and vomiting.   Musculoskeletal:  Negative for neck pain and neck stiffness.   Neurological:  Negative for dizziness, weakness and headaches.   Psychiatric/Behavioral:  Negative for confusion.    All other systems reviewed and are negative.      Physical Exam     Initial Vitals [24 2315]   BP Pulse Resp Temp SpO2   137/73 85 18 99 °F (37.2 °C) (!) 89 %      MAP       --         Physical Exam    Nursing note and vitals reviewed.  Constitutional: She appears well-developed and well-nourished.   HENT:   Head: Normocephalic and atraumatic.   Right Ear: External ear normal.   Left Ear: External ear normal.   Nose: Nose normal. Mouth/Throat: Oropharynx is clear and moist.   Eyes: EOM are normal. Pupils are equal, round, and reactive to light.   Neck: Neck supple.   Normal range of motion.  Cardiovascular:  Normal rate, regular rhythm and normal heart sounds.            Pulmonary/Chest: She has wheezes.   Musculoskeletal:         General: Normal range of motion.      Cervical back: Normal range of motion and neck supple.     Neurological: She is alert and oriented to person, place, and time. She has normal strength. GCS score is 15. GCS eye subscore is 4. GCS verbal subscore is 5. GCS motor subscore is 6.   Skin: Skin is warm. Capillary refill takes less than 2 seconds.   Psychiatric: She has a normal mood and affect. Her behavior is normal. Judgment and thought content normal.         Medical Screening Exam   See Full Note    ED Course   Procedures  Labs Reviewed - No data to display       Imaging Results              X-Ray Chest PA And Lateral (Final result)  Result time 12/30/24 22:57:42      Final result by Mando Pugh MD (12/30/24 22:57:42)                   Impression:      No acute abnormality.      Electronically signed by: Mando Pugh  Date:    12/30/2024  Time:    22:57               Narrative:    EXAMINATION:  XR CHEST PA AND LATERAL    CLINICAL HISTORY:  cough;    TECHNIQUE:  PA and lateral views of the chest were performed.    COMPARISON:  09/30/2022    FINDINGS:  The lungs are clear, with normal appearance of pulmonary vasculature and no pleural effusion or pneumothorax.    The cardiac silhouette is normal in size. The hilar and mediastinal contours are unremarkable.    Bones are intact.                                       Medications   albuterol-ipratropium 2.5 mg-0.5 mg/3 mL nebulizer solution 3 mL (3 mLs Nebulization Given 12/30/24 1632)     Medical Decision Making  60 yo female ambulatory to ED with c/o productive cough. Diagnosed with Flu 12/24 and Strep 12/26. Just completed zithromax and steroids.     The history is provided by the patient.     Vitals reviewed  CXR with no acute abnormality  Patient discharged home with promethazine DM and albuterol MDI    Strict return and follow-up precautions have been given by me personally to the  patient/family/caregiver(s).    Data Reviewed/Counseling: I have reviewed the patient's vital signs, nursing notes, and other relevant tests/information. I had a detailed discussion regarding the historical points, exam findings, and any diagnostic results supporting the discharge diagnosis. I also discussed the need for outpatient follow-up and the need to return to the ED if symptoms worsen or if there are any questions or concerns that arise at home.      Amount and/or Complexity of Data Reviewed  Radiology: ordered. Decision-making details documented in ED Course.    Risk  Prescription drug management.                                      Clinical Impression:   Final diagnoses:  [J06.9] Viral URI with cough (Primary)        ED Disposition Condition    Discharge Stable          ED Prescriptions       Medication Sig Dispense Start Date End Date Auth. Provider    promethazine-dextromethorphan (PROMETHAZINE-DM) 6.25-15 mg/5 mL Syrp Take 5 mLs by mouth every 4 (four) hours as needed (cough). 118 mL 12/30/2024 1/9/2025 Fidelina Hoffman FNP    albuterol (PROAIR HFA) 90 mcg/actuation inhaler Inhale 2 puffs into the lungs every 6 (six) hours as needed for Shortness of Breath. Rescue 8 g 12/30/2024 12/30/2025 Fidelina Hoffman FNP          Follow-up Information    None                      Fidelina Hoffman FNP  12/30/24 [id number]

## 2024-12-31 NOTE — ED TRIAGE NOTES
Patient arrived to ED via private vehicle with spouse, with c/o cough, congestion and fever x 1 week,

## 2024-12-31 NOTE — ED NOTES
Patient discharged to home with spouse via private vehicle, discharge instructions given with voiced understanding. OLEGARIO

## 2025-01-02 ENCOUNTER — PATIENT OUTREACH (OUTPATIENT)
Dept: EMERGENCY MEDICINE | Facility: HOSPITAL | Age: 60
End: 2025-01-02
Payer: COMMERCIAL

## 2025-01-02 NOTE — PROGRESS NOTES
"1/2/25  Attempt X 1 Telephone Messages " Your call has been forward to voice message system please leave a message after the tone and then hang up" Left message for patient to return call at [phone]   Kelin Mcdaniel Lpn-ED Navigator  Ph# [phone]    1/3/25  Attempt [id number] Messages " Your call has been forward to voice message system please leave a message after the tone and then hang up" Left message for patient to return call at [phone]. Chart will be closed   Kelin Mcdaniel Lpn-ED Navigator  Ph# [phone]  "